# Patient Record
Sex: MALE | Race: WHITE | NOT HISPANIC OR LATINO | URBAN - NONMETROPOLITAN AREA
[De-identification: names, ages, dates, MRNs, and addresses within clinical notes are randomized per-mention and may not be internally consistent; named-entity substitution may affect disease eponyms.]

---

## 2021-02-08 ENCOUNTER — IMPORTED ENCOUNTER (OUTPATIENT)
Dept: URBAN - NONMETROPOLITAN AREA CLINIC 1 | Facility: CLINIC | Age: 61
End: 2021-02-08

## 2021-02-08 ENCOUNTER — PREPPED CHART (OUTPATIENT)
Dept: URBAN - NONMETROPOLITAN AREA CLINIC 4 | Facility: CLINIC | Age: 61
End: 2021-02-08

## 2021-02-08 PROBLEM — Z79.899: Noted: 2021-02-08

## 2021-02-08 PROBLEM — H52.11: Noted: 2021-02-08

## 2021-02-08 PROBLEM — H52.4: Noted: 2021-02-08

## 2021-02-08 PROBLEM — H52.223: Noted: 2021-02-08

## 2021-02-08 PROCEDURE — 92004 COMPRE OPH EXAM NEW PT 1/>: CPT

## 2021-02-08 PROCEDURE — 92015 DETERMINE REFRACTIVE STATE: CPT

## 2021-02-08 NOTE — PATIENT DISCUSSION
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 Liz Su at [address] [facility] [address] [facility]-  RTC 6 mo f/u [address] w/OCT Mac & 10-2 VF; Dr's Notes: [address] [facility] [id number]. MR 2/8/2021DFE 2/8/2021

## 2021-09-16 ENCOUNTER — IMPORTED ENCOUNTER (OUTPATIENT)
Dept: URBAN - NONMETROPOLITAN AREA CLINIC 1 | Facility: CLINIC | Age: 61
End: 2021-09-16

## 2021-09-16 PROBLEM — Z79.899: Noted: 2021-09-16

## 2021-09-16 PROBLEM — H52.223: Noted: 2021-09-16

## 2021-09-16 PROBLEM — H52.11: Noted: 2021-09-16

## 2021-09-16 PROBLEM — H52.4: Noted: 2021-09-16

## 2021-09-16 PROCEDURE — 99213 OFFICE O/P EST LOW 20 MIN: CPT

## 2021-09-16 PROCEDURE — 92134 CPTRZ OPH DX IMG PST SGM RTA: CPT

## 2021-09-16 PROCEDURE — 92083 EXTENDED VISUAL FIELD XM: CPT

## 2021-09-16 NOTE — PATIENT DISCUSSION
HRMU-  discussed findings w/patient-  no maculopathy noted at this time-  patient was on Plaquenil for about 6 mo but then it was d/c'ed-  patient was put on Methotrexate and that was also d/c'ed-  OCT Mac done 9/16/2021    OD: 264 avg thickness (-) maculopathy    OS: 277 avg thickness (-) maculopathy-  10-2 VF done 9/16/2021    OD: R full - no defects    OS: R full - no defects-  send notes to Dr. Mali Escobar at [address] [facility]-  RTC 6 mo Routine or prn; Dr's Notes: [address] [facility] [id number]. MR 2/8/2021DFE 2/8/2021

## 2022-02-04 ASSESSMENT — VISUAL ACUITY
OD_CC: 20/30+2
OS_CC: 20/40

## 2022-02-09 ENCOUNTER — COMPREHENSIVE EXAM (OUTPATIENT)
Dept: URBAN - NONMETROPOLITAN AREA CLINIC 4 | Facility: CLINIC | Age: 62
End: 2022-02-09

## 2022-02-09 DIAGNOSIS — H52.223: ICD-10-CM

## 2022-02-09 DIAGNOSIS — H52.4: ICD-10-CM

## 2022-02-09 DIAGNOSIS — H52.02: ICD-10-CM

## 2022-02-09 DIAGNOSIS — H52.11: ICD-10-CM

## 2022-02-09 PROCEDURE — 92015 DETERMINE REFRACTIVE STATE: CPT

## 2022-02-09 PROCEDURE — 92014 COMPRE OPH EXAM EST PT 1/>: CPT

## 2022-02-09 ASSESSMENT — TONOMETRY
OD_IOP_MMHG: 20
OS_IOP_MMHG: 18

## 2022-02-09 ASSESSMENT — VISUAL ACUITY
OD_CC: 20/30+3
OS_CC: 20/25

## 2022-02-09 NOTE — PATIENT DISCUSSION
Advised regular use of [place] grid. Patient stopped Plaquenil 3-4 mo ago.  Will continue to monitor as scheduled or prn.

## 2022-04-15 ASSESSMENT — TONOMETRY
OD_IOP_MMHG: 17
OD_IOP_MMHG: 16
OS_IOP_MMHG: 16
OS_IOP_MMHG: 17

## 2022-04-15 ASSESSMENT — VISUAL ACUITY
OS_SC: 20/25-2
OS_SC: 20/40
OD_SC: 20/25+
OD_SC: 20/30-2

## 2023-08-25 ENCOUNTER — COMPREHENSIVE EXAM (OUTPATIENT)
Dept: RURAL CLINIC 1 | Facility: CLINIC | Age: 63
End: 2023-08-25

## 2023-08-25 DIAGNOSIS — H25.9: ICD-10-CM

## 2023-08-25 DIAGNOSIS — Z79.899: ICD-10-CM

## 2023-08-25 PROCEDURE — 92014 COMPRE OPH EXAM EST PT 1/>: CPT

## 2023-08-25 ASSESSMENT — TONOMETRY
OD_IOP_MMHG: 18
OS_IOP_MMHG: 18

## 2023-08-25 ASSESSMENT — VISUAL ACUITY
OD_CC: 20/25-2
OU_CC: 20/25-1
OS_CC: 20/25-2
OD_CC: 20/30-1
OU_CC: 20/25-2
OS_CC: 20/50-1

## 2024-09-27 ENCOUNTER — COMPREHENSIVE EXAM (OUTPATIENT)
Dept: URBAN - NONMETROPOLITAN AREA CLINIC 4 | Facility: CLINIC | Age: 64
End: 2024-09-27

## 2024-09-27 DIAGNOSIS — H25.813: ICD-10-CM

## 2024-09-27 DIAGNOSIS — H52.4: ICD-10-CM

## 2024-09-27 DIAGNOSIS — H52.223: ICD-10-CM

## 2024-09-27 DIAGNOSIS — H53.022: ICD-10-CM

## 2024-09-27 PROCEDURE — 92014 COMPRE OPH EXAM EST PT 1/>: CPT

## 2024-09-27 PROCEDURE — 92015 DETERMINE REFRACTIVE STATE: CPT
